# Patient Record
Sex: MALE | Race: WHITE | NOT HISPANIC OR LATINO | Employment: OTHER | URBAN - METROPOLITAN AREA
[De-identification: names, ages, dates, MRNs, and addresses within clinical notes are randomized per-mention and may not be internally consistent; named-entity substitution may affect disease eponyms.]

---

## 2017-01-06 PROBLEM — J01.00 ACUTE NON-RECURRENT MAXILLARY SINUSITIS: Status: ACTIVE | Noted: 2017-01-06

## 2017-04-10 PROBLEM — J01.00 ACUTE NON-RECURRENT MAXILLARY SINUSITIS: Status: RESOLVED | Noted: 2017-01-06 | Resolved: 2017-04-10

## 2017-04-18 PROBLEM — G62.9 PERIPHERAL POLYNEUROPATHY: Status: ACTIVE | Noted: 2017-04-18

## 2017-04-18 PROBLEM — I73.9 CLAUDICATION: Status: ACTIVE | Noted: 2017-04-18

## 2018-08-18 LAB
AMYLASE SERPL-CCNC: 1049 U/L (ref 28–100)
LIPASE SERPL-CCNC: 457 U/L (ref 0–38)

## 2018-08-30 LAB — MAGNESIUM SERPL-MCNC: 1.7 MG/DL (ref 1.5–2.6)

## 2018-09-01 ENCOUNTER — OUTSIDE PLACE OF SERVICE (OUTPATIENT)
Dept: PULMONOLOGY | Facility: CLINIC | Age: 73
End: 2018-09-01
Payer: MEDICARE

## 2018-09-01 PROCEDURE — 99232 SBSQ HOSP IP/OBS MODERATE 35: CPT | Mod: ,,, | Performed by: INTERNAL MEDICINE

## 2018-09-27 RX ORDER — AMLODIPINE BESYLATE 5 MG/1
5 TABLET ORAL DAILY
Refills: 3 | COMMUNITY
Start: 2018-06-25 | End: 2022-11-23 | Stop reason: SDUPTHER

## 2018-09-27 RX ORDER — EZETIMIBE AND SIMVASTATIN 10; 40 MG/1; MG/1
TABLET ORAL
COMMUNITY
End: 2019-12-05

## 2018-09-27 RX ORDER — CEFUROXIME AXETIL 500 MG/1
TABLET ORAL
COMMUNITY
End: 2018-11-12

## 2018-09-27 RX ORDER — LISINOPRIL 20 MG/1
TABLET ORAL
COMMUNITY
End: 2018-09-28

## 2018-09-27 RX ORDER — CEPHALEXIN 500 MG/1
CAPSULE ORAL
COMMUNITY
End: 2018-11-12

## 2018-09-27 RX ORDER — METFORMIN HYDROCHLORIDE 1000 MG/1
TABLET ORAL
COMMUNITY
End: 2018-11-14

## 2018-09-27 RX ORDER — METOPROLOL TARTRATE 25 MG/1
TABLET, FILM COATED ORAL
COMMUNITY
End: 2018-09-27 | Stop reason: SDUPTHER

## 2018-09-27 RX ORDER — OMEGA-3-ACID ETHYL ESTERS 1 G/1
1 CAPSULE, LIQUID FILLED ORAL
COMMUNITY
End: 2018-11-29 | Stop reason: ALTCHOICE

## 2018-09-27 RX ORDER — BETAMETHASONE SODIUM PHOSPHATE AND BETAMETHASONE ACETATE 3; 3 MG/ML; MG/ML
6 INJECTION, SUSPENSION INTRA-ARTICULAR; INTRALESIONAL; INTRAMUSCULAR; SOFT TISSUE
COMMUNITY
Start: 2014-03-05 | End: 2019-04-04

## 2018-09-27 RX ORDER — FAMOTIDINE 20 MG/1
TABLET, FILM COATED ORAL
COMMUNITY
End: 2018-11-29 | Stop reason: ALTCHOICE

## 2018-09-28 ENCOUNTER — OFFICE VISIT (OUTPATIENT)
Dept: SURGERY | Facility: CLINIC | Age: 73
End: 2018-09-28
Payer: MEDICARE

## 2018-09-28 DIAGNOSIS — K85.91 ACUTE PANCREATITIS WITH UNINFECTED NECROSIS, UNSPECIFIED PANCREATITIS TYPE: Primary | ICD-10-CM

## 2018-09-28 PROCEDURE — 99214 OFFICE O/P EST MOD 30 MIN: CPT | Mod: ,,, | Performed by: SURGERY

## 2018-09-28 RX ORDER — PANTOPRAZOLE SODIUM 40 MG/1
40 TABLET, DELAYED RELEASE ORAL DAILY
COMMUNITY
End: 2018-11-29 | Stop reason: ALTCHOICE

## 2018-10-01 NOTE — PROGRESS NOTES
Patient presents for hospital follow-up. Patient had severe necrotizing pancreatitis. Fortunately he recovered. He is currently residing in a rehabilitation facility. Getting rid go home very soon. He is not symptomatic.    Physical examination shows a nontender nondistended abdomen. Patient is a bit weak looking. A bit pale. No respiratory difficulty. Heart is regular.    Last CT scan of the abdomen was performed on 09/04/2018. It showed some peripancreatic fluid. And improving appearance of the pancreas. Ex    Impression/plan: Resolving severe pancreatitis. Peripancreatic fluid collections. At this time I would just follow. Will repeat a CT scan in one month. We'll see patient back here in 6 weeks. We'll base further recommendations on findings of the CT scan.    Case discussed at length with patient's family was present. All medical records were reviewed at length.

## 2018-10-17 ENCOUNTER — TELEPHONE (OUTPATIENT)
Dept: SURGERY | Facility: CLINIC | Age: 73
End: 2018-10-17

## 2018-10-17 DIAGNOSIS — K85.91 ACUTE PANCREATITIS WITH UNINFECTED NECROSIS, UNSPECIFIED PANCREATITIS TYPE: Primary | ICD-10-CM

## 2018-10-17 DIAGNOSIS — R10.84 GENERALIZED ABDOMINAL PAIN: ICD-10-CM

## 2018-10-17 NOTE — TELEPHONE ENCOUNTER
Pt called for CT order.. Per last office visit 9/28/18 pt instructed to RTO in 6 weeks, complete CT 2 weeks prior.    Please Sign order

## 2018-10-26 LAB — ISTAT CREATININE: 0.9 MG/DL (ref 0.6–1.4)

## 2018-10-29 ENCOUNTER — TELEPHONE (OUTPATIENT)
Dept: SURGERY | Facility: CLINIC | Age: 73
End: 2018-10-29

## 2018-10-29 NOTE — TELEPHONE ENCOUNTER
----- Message from Hector ZAVALA MD sent at 10/29/2018 11:20 AM CDT -----  Call patient. CT scan showed some improvement.

## 2018-11-12 ENCOUNTER — OFFICE VISIT (OUTPATIENT)
Dept: SURGERY | Facility: CLINIC | Age: 73
End: 2018-11-12
Payer: MEDICARE

## 2018-11-12 VITALS
SYSTOLIC BLOOD PRESSURE: 122 MMHG | BODY MASS INDEX: 30.93 KG/M2 | WEIGHT: 241 LBS | DIASTOLIC BLOOD PRESSURE: 76 MMHG | HEIGHT: 74 IN

## 2018-11-12 DIAGNOSIS — K85.91 ACUTE PANCREATITIS WITH UNINFECTED NECROSIS, UNSPECIFIED PANCREATITIS TYPE: Primary | ICD-10-CM

## 2018-11-12 PROCEDURE — 99213 OFFICE O/P EST LOW 20 MIN: CPT | Mod: ,,, | Performed by: SURGERY

## 2018-11-12 NOTE — PROGRESS NOTES
"Subjective:       Patient ID: Michael Maldonado is a 73 y.o. male.    Chief Complaint: Consult (Eval possible lap kaylah )      HPI:  Patient is completely recovered from his severe bout of pancreatitis. He is at home and asymptomatic. Both he and his family feel that he is under back to normal. Patient's pseudocyst is shrinking and is asymptomatic. It does not fulfill criteria for surgical intervention. Patient is ready to undergo cholecystectomy to prevent future occurrences of pancreatitis.    Past Medical History:   Diagnosis Date    Coronary atherosclerosis of unspecified type of vessel, native or graft     Diabetes mellitus, type 2     Gout, unspecified     Other and unspecified hyperlipidemia     Unspecified essential hypertension      Past Surgical History:   Procedure Laterality Date    COLONOSCOPY  2010    CORONARY ANGIOPLASTY WITH STENT PLACEMENT  2016    CORONARY ARTERY BYPASS GRAFT  2000        TONSILLECTOMY, ADENOIDECTOMY       Review of patient's allergies indicates:   Allergen Reactions    Cefuroxime axetil Nausea And Vomiting     vomiting        Medication List           Accurate as of 11/12/18 10:09 AM. If you have any questions, ask your nurse or doctor.               CONTINUE taking these medications    amLODIPine 5 MG tablet  Commonly known as:  NORVASC     aspirin 81 MG Chew     aspirin-calcium carbonate 81 mg-300 mg calcium(777 mg) Tab     atorvastatin 40 MG tablet  Commonly known as:  LIPITOR  TAKE 1 TABLET BY MOUTH EACH DAY "THANK YOU"     betamethasone acetate-betamethasone sodium phosphate 6 mg/mL injection  Commonly known as:  CELESTONE     blood sugar diagnostic Strp  Commonly known as:  CONTOUR TEST STRIPS  1 strip by Misc.(Non-Drug; Combo Route) route 2 (two) times daily.     clopidogrel 75 mg tablet  Commonly known as:  PLAVIX  TAKE 1 TABLET BY MOUTH DAILY "THANK YOU"     famotidine 20 MG tablet  Commonly known as:  PEPCID     JANUMET 50-1,000 mg per tablet  Generic " "drug:  SITagliptan-metformin  TAKE 1 TABLET BY MOUTH TWICE DAILY WITH MEALS "THANK YOU"     lisinopril-hydrochlorothiazide 20-12.5 mg per tablet  Commonly known as:  PRINZIDE,ZESTORETIC  Take 1 tablet by mouth 2 (two) times daily.     metFORMIN 1000 MG tablet  Commonly known as:  GLUCOPHAGE     metoprolol succinate 25 MG 24 hr tablet  Commonly known as:  TOPROL-XL  Take 1 tablet (25 mg total) by mouth 2 (two) times daily. Pt takes 1 tablet in am and 1/2 tablet in evening.  (total of 1 1/2 tablets a day)     omega-3 acid ethyl esters 1 gram capsule  Commonly known as:  LOVAZA     pantoprazole 40 MG tablet  Commonly known as:  PROTONIX     promethazine 25 MG tablet  Commonly known as:  PHENERGAN  Take 1 tablet (25 mg total) by mouth every 6 (six) hours as needed for Nausea.     VYTORIN 10-40 10-40 mg per tablet  Generic drug:  ezetimibe-simvastatin 10-40 mg        STOP taking these medications    cefUROXime 500 MG tablet  Commonly known as:  CEFTIN  Stopped by:  Hector Smallwood MD     cephALEXin 500 MG capsule  Commonly known as:  KEFLEX  Stopped by:  Hector Smallwood MD          History reviewed. No pertinent family history.  Social History     Socioeconomic History    Marital status:      Spouse name: None    Number of children: None    Years of education: None    Highest education level: None   Social Needs    Financial resource strain: None    Food insecurity - worry: None    Food insecurity - inability: None    Transportation needs - medical: None    Transportation needs - non-medical: None   Occupational History    None   Tobacco Use    Smoking status: Former Smoker    Smokeless tobacco: Former User     Types: Chew   Substance and Sexual Activity    Alcohol use: Yes     Alcohol/week: 1.0 oz     Types: 2 Standard drinks or equivalent per week    Drug use: No    Sexual activity: None   Other Topics Concern    None   Social History Narrative    None         Review of Systems    Objective: "      Physical Exam   Constitutional: He appears well-developed and well-nourished.  Non-toxic appearance. No distress.   HENT:   Head: Normocephalic and atraumatic. Head is without abrasion and without laceration.   Right Ear: External ear normal.   Left Ear: External ear normal.   Nose: Nose normal.   Mouth/Throat: Oropharynx is clear and moist.   Eyes: EOM are normal. Pupils are equal, round, and reactive to light.   Neck: Trachea normal. No tracheal deviation and normal range of motion present. No thyroid mass and no thyromegaly present.   Cardiovascular: Normal rate and regular rhythm.   Pulmonary/Chest: Effort normal. No accessory muscle usage. No tachypnea. No respiratory distress.   Abdominal: Soft. Normal appearance and bowel sounds are normal. He exhibits no distension and no mass. There is no hepatosplenomegaly. There is no tenderness. There is no tenderness at McBurney's point and negative Henry's sign. No hernia.   Lymphadenopathy:     He has no cervical adenopathy.     He has no axillary adenopathy.        Right: No inguinal adenopathy present.        Left: No inguinal adenopathy present.   Neurological: He is alert. Coordination and gait normal.   Skin: Skin is warm and intact.   Psychiatric: He has a normal mood and affect. His speech is normal and behavior is normal.       Assessment/Plan:   Acute pancreatitis with uninfected necrosis, unspecified pancreatitis type  -     CBC auto differential; Future; Expected date: 11/12/2018  -     Comprehensive metabolic panel; Future; Expected date: 11/12/2018  -     Ambulatory Referral to External Surgery        Planned procedure: Laparoscopic cholecystectomy    Mefoxin 2 gm IV on call to OR unless allergic, if allergic use Levaquin 500 mg IV AND Flagyl 500 mg IV on call to OR    NPO past midnight    Kingsley cloth scrub per protocol    SCDs Bilateral Lower Extremities    I discussed the proposed procedures the the patient including risks, benefits, indications,  alternatives and special concerns.  The patient appears to understand and agrees to go ahead with surgery.  I have made no promises, warranties or verbal agreements beyond what was discussed above.    No Follow-up on file.

## 2018-11-15 LAB
ALBUMIN SERPL-MCNC: 3.5 G/DL (ref 3.1–4.7)
ALP SERPL-CCNC: 123 IU/L (ref 40–104)
ALT (SGPT): 31 IU/L (ref 3–33)
AST SERPL-CCNC: 34 IU/L (ref 10–40)
BASOPHILS NFR BLD: 0.1 K/UL (ref 0–0.2)
BASOPHILS NFR BLD: 0.5 %
BILIRUB SERPL-MCNC: 0.7 MG/DL (ref 0.3–1)
BUN SERPL-MCNC: 10 MG/DL (ref 8–20)
CALCIUM SERPL-MCNC: 9.3 MG/DL (ref 7.7–10.4)
CHLORIDE: 98 MMOL/L (ref 98–110)
CO2 SERPL-SCNC: 30 MMOL/L (ref 22.8–31.6)
CREATININE: 0.78 MG/DL (ref 0.6–1.4)
EOSINOPHIL NFR BLD: 0.2 K/UL (ref 0–0.7)
EOSINOPHIL NFR BLD: 2 %
ERYTHROCYTE [DISTWIDTH] IN BLOOD BY AUTOMATED COUNT: 14.1 % (ref 11.7–14.9)
GLUCOSE: 174 MG/DL (ref 70–99)
GRAN #: 4.3 K/UL (ref 1.4–6.5)
GRAN%: 39.9 %
HCT VFR BLD AUTO: 37.8 % (ref 39–55)
HGB BLD-MCNC: 12.4 G/DL (ref 14–16)
IMMATURE GRANS (ABS): 0 K/UL (ref 0–1)
IMMATURE GRANULOCYTES: 0.3 %
LYMPH #: 5.5 K/UL (ref 1.2–3.4)
LYMPH%: 51.3 %
MCH RBC QN AUTO: 27.7 PG (ref 25–35)
MCHC RBC AUTO-ENTMCNC: 32.8 G/DL (ref 31–36)
MCV RBC AUTO: 84.4 FL (ref 80–100)
MONO #: 0.7 K/UL (ref 0.1–0.6)
MONO%: 6 %
NUCLEATED RBCS: 0 %
PLATELET # BLD AUTO: 236 K/UL (ref 140–440)
PMV BLD AUTO: 10.4 FL (ref 8.8–12.7)
POTASSIUM SERPL-SCNC: 3.8 MMOL/L (ref 3.5–5)
PROT SERPL-MCNC: 6.9 G/DL (ref 6–8.2)
RBC # BLD AUTO: 4.48 M/UL (ref 4.3–5.9)
SODIUM: 135 MMOL/L (ref 134–144)
WBC # BLD AUTO: 10.8 K/UL (ref 5–10)

## 2018-12-03 ENCOUNTER — OFFICE VISIT (OUTPATIENT)
Dept: SURGERY | Facility: CLINIC | Age: 73
End: 2018-12-03
Payer: MEDICARE

## 2018-12-03 VITALS
HEIGHT: 74 IN | BODY MASS INDEX: 26.69 KG/M2 | WEIGHT: 208 LBS | DIASTOLIC BLOOD PRESSURE: 76 MMHG | SYSTOLIC BLOOD PRESSURE: 124 MMHG

## 2018-12-03 DIAGNOSIS — K85.91 ACUTE PANCREATITIS WITH UNINFECTED NECROSIS, UNSPECIFIED PANCREATITIS TYPE: Primary | ICD-10-CM

## 2018-12-03 PROCEDURE — 99024 POSTOP FOLLOW-UP VISIT: CPT | Mod: POP,,, | Performed by: SURGERY

## 2018-12-03 NOTE — PROGRESS NOTES
Subjective:       Patient ID: Michael Maldonado is a 73 y.o. male.    Chief Complaint: Post-op Evaluation ([facility] DOS 11/20/18 L/S Allyson)      HPI:  Michael Maldondao is here for post-op. Patient has no systemic complaints. Post operative   pain is under control.  Tolerating diet, no nausea/vomiting.  Having normal bowel movements.        Objective:      Physical Exam   Constitutional: He is oriented to person, place, and time. He is cooperative. No distress.   Neurological: He is alert and oriented to person, place, and time.   Skin:   Incisions are clean, dry and intact   There is no evidence of infection, hematoma or seroma.        Assessment/Plan:   Acute pancreatitis with uninfected necrosis, unspecified pancreatitis type      Doing well status post labral scopic cholecystectomy    Follow-up if symptoms worsen or fail to improve.

## 2021-07-29 ENCOUNTER — OFFICE VISIT (OUTPATIENT)
Dept: URGENT CARE | Facility: CLINIC | Age: 76
End: 2021-07-29
Payer: MEDICARE

## 2021-07-29 VITALS
HEIGHT: 74 IN | TEMPERATURE: 98 F | DIASTOLIC BLOOD PRESSURE: 54 MMHG | WEIGHT: 228 LBS | RESPIRATION RATE: 18 BRPM | HEART RATE: 71 BPM | OXYGEN SATURATION: 95 % | BODY MASS INDEX: 29.26 KG/M2 | SYSTOLIC BLOOD PRESSURE: 105 MMHG

## 2021-07-29 DIAGNOSIS — L03.116 CELLULITIS AND ABSCESS OF LEFT LEG: ICD-10-CM

## 2021-07-29 DIAGNOSIS — J18.9 PNEUMONIA OF LOWER LOBE DUE TO INFECTIOUS ORGANISM, UNSPECIFIED LATERALITY: Primary | ICD-10-CM

## 2021-07-29 DIAGNOSIS — L02.416 CELLULITIS AND ABSCESS OF LEFT LEG: ICD-10-CM

## 2021-07-29 PROCEDURE — 99204 OFFICE O/P NEW MOD 45 MIN: CPT | Mod: S$GLB,,, | Performed by: NURSE PRACTITIONER

## 2021-07-29 PROCEDURE — 99204 PR OFFICE/OUTPT VISIT, NEW, LEVL IV, 45-59 MIN: ICD-10-PCS | Mod: S$GLB,,, | Performed by: NURSE PRACTITIONER

## 2021-07-29 RX ORDER — MUPIROCIN 20 MG/G
OINTMENT TOPICAL 2 TIMES DAILY
Qty: 30 G | Refills: 0 | Status: SHIPPED | OUTPATIENT
Start: 2021-07-29 | End: 2023-03-19

## 2021-07-29 RX ORDER — LEVOFLOXACIN 500 MG/1
500 TABLET, FILM COATED ORAL DAILY
Qty: 10 TABLET | Refills: 0 | Status: SHIPPED | OUTPATIENT
Start: 2021-07-29 | End: 2021-08-08

## 2022-06-17 ENCOUNTER — OCCUPATIONAL HEALTH (OUTPATIENT)
Dept: URGENT CARE | Facility: CLINIC | Age: 77
End: 2022-06-17

## 2022-06-17 DIAGNOSIS — Z13.9 ENCOUNTER FOR SCREENING: Primary | ICD-10-CM

## 2022-06-17 LAB — COLLECTION ONLY: NORMAL

## 2022-06-17 PROCEDURE — 80306 OOH DOT DRUG SCREEN: ICD-10-PCS | Mod: S$GLB,,, | Performed by: NURSE PRACTITIONER

## 2022-06-17 PROCEDURE — 99499 PHYSICAL - BASIC COMPLEXITY: ICD-10-PCS | Mod: S$GLB,,, | Performed by: NURSE PRACTITIONER

## 2022-06-17 PROCEDURE — 99499 UNLISTED E&M SERVICE: CPT | Mod: S$GLB,,, | Performed by: NURSE PRACTITIONER

## 2022-06-17 PROCEDURE — 80306 DRUG TEST PRSMV INSTRMNT: CPT | Mod: S$GLB,,, | Performed by: NURSE PRACTITIONER

## 2023-01-10 ENCOUNTER — OFFICE VISIT (OUTPATIENT)
Dept: URGENT CARE | Facility: CLINIC | Age: 78
End: 2023-01-10
Payer: MEDICARE

## 2023-01-10 VITALS
WEIGHT: 220 LBS | OXYGEN SATURATION: 97 % | DIASTOLIC BLOOD PRESSURE: 60 MMHG | BODY MASS INDEX: 28.23 KG/M2 | HEART RATE: 63 BPM | TEMPERATURE: 97 F | SYSTOLIC BLOOD PRESSURE: 122 MMHG | HEIGHT: 74 IN

## 2023-01-10 DIAGNOSIS — J06.9 UPPER RESPIRATORY TRACT INFECTION, UNSPECIFIED TYPE: Primary | ICD-10-CM

## 2023-01-10 PROCEDURE — 99203 OFFICE O/P NEW LOW 30 MIN: CPT | Mod: S$GLB,,, | Performed by: NURSE PRACTITIONER

## 2023-01-10 PROCEDURE — 99203 PR OFFICE/OUTPT VISIT, NEW, LEVL III, 30-44 MIN: ICD-10-PCS | Mod: S$GLB,,, | Performed by: NURSE PRACTITIONER

## 2023-01-10 RX ORDER — PREDNISONE 10 MG/1
TABLET ORAL
Qty: 8 TABLET | Refills: 0 | Status: SHIPPED | OUTPATIENT
Start: 2023-01-10 | End: 2023-02-28

## 2023-01-10 RX ORDER — PROMETHAZINE HYDROCHLORIDE AND DEXTROMETHORPHAN HYDROBROMIDE 6.25; 15 MG/5ML; MG/5ML
5 SYRUP ORAL EVERY 4 HOURS PRN
Qty: 120 ML | Refills: 0 | Status: SHIPPED | OUTPATIENT
Start: 2023-01-10 | End: 2023-01-20

## 2023-01-10 RX ORDER — AZITHROMYCIN 250 MG/1
TABLET, FILM COATED ORAL
Qty: 6 TABLET | Refills: 0 | Status: SHIPPED | OUTPATIENT
Start: 2023-01-10 | End: 2023-02-28

## 2023-01-10 NOTE — PROGRESS NOTES
"Subjective:       Patient ID: Michael Maldonado is a 77 y.o. male.    Vitals:  height is 6' 2" (1.88 m) and weight is 99.8 kg (220 lb). His oral temperature is 97.4 °F (36.3 °C). His blood pressure is 122/60 and his pulse is 63. His oxygen saturation is 97%.     Chief Complaint: Sinus Problem    This is a 77 y.o. male who presents today with a chief complaint of  Patient presents with:  Sinus Problem x 10 days. Patient stated that it started on 1/1, same symptoms.         Sinus Problem  This is a new problem. The current episode started 1 to 4 weeks ago. The problem is unchanged. There has been no fever. Associated symptoms include congestion, coughing, sinus pressure and a sore throat.     HENT:  Positive for congestion, sinus pressure and sore throat.    Respiratory:  Positive for cough.          Objective:      Physical Exam   Constitutional: He is oriented to person, place, and time. He appears well-developed. He is cooperative.  Non-toxic appearance. He does not appear ill. No distress.   HENT:   Head: Normocephalic and atraumatic.   Ears:   Right Ear: Hearing, external ear and ear canal normal. Tympanic membrane is injected, erythematous and bulging.   Left Ear: Hearing, external ear and ear canal normal. Tympanic membrane is injected, erythematous and bulging.   Nose: Nose normal. Purulent discharge present. No mucosal edema, rhinorrhea or nasal deformity. No epistaxis. Right sinus exhibits no maxillary sinus tenderness and no frontal sinus tenderness. Left sinus exhibits no maxillary sinus tenderness and no frontal sinus tenderness.   Mouth/Throat: Uvula is midline and mucous membranes are normal. No trismus in the jaw. Normal dentition. No uvula swelling. Posterior oropharyngeal erythema present. No oropharyngeal exudate or posterior oropharyngeal edema.   Eyes: Conjunctivae and lids are normal. No scleral icterus.   Neck: Trachea normal and phonation normal. Neck supple. No edema present. No erythema present. " No neck rigidity present.   Cardiovascular: Normal rate, regular rhythm, normal heart sounds and normal pulses.   Pulmonary/Chest: Effort normal and breath sounds normal. No respiratory distress. He has no decreased breath sounds. He has no rhonchi.   Abdominal: Normal appearance.   Musculoskeletal: Normal range of motion.         General: No deformity. Normal range of motion.   Neurological: He is alert and oriented to person, place, and time. He exhibits normal muscle tone. Coordination normal.   Skin: Skin is warm, dry, intact, not diaphoretic and not pale.   Psychiatric: His speech is normal and behavior is normal. Judgment and thought content normal.   Nursing note and vitals reviewed.      Past medical history and current medications reviewed.       Assessment:           1. Upper respiratory tract infection, unspecified type              Plan:         Upper respiratory tract infection, unspecified type             Patient Instructions     You must understand that you've received an Urgent Care treatment only and that you may be released before all your medical problems are known or treated. You, the patient, will arrange for follow up care as instructed.  Follow up with your PCP or specialty clinic as directed in the next 1-2 weeks if not improved or as needed.  You can call [phone] to schedule an appointment with the appropriate provider.  If your condition worsens we recommend that you receive another evaluation at the emergency room immediately or contact your primary medical clinics after hours call service to discuss your concerns.  Please return here or go to the Emergency Department for any concerns or worsening of condition.    If you were prescribed a narcotic or controlled medication, do not drive or operate heavy equipment or machinery while taking these medications.

## 2023-01-10 NOTE — PATIENT INSTRUCTIONS
You must understand that you've received an Urgent Care treatment only and that you may be released before all your medical problems are known or treated. You, the patient, will arrange for follow up care as instructed.  Follow up with your PCP or specialty clinic as directed in the next 1-2 weeks if not improved or as needed.  You can call [phone] to schedule an appointment with the appropriate provider.  If your condition worsens we recommend that you receive another evaluation at the emergency room immediately or contact your primary medical clinics after hours call service to discuss your concerns.  Please return here or go to the Emergency Department for any concerns or worsening of condition.    If you were prescribed a narcotic or controlled medication, do not drive or operate heavy equipment or machinery while taking these medications.

## 2023-03-17 ENCOUNTER — OFFICE VISIT (OUTPATIENT)
Dept: PODIATRY | Facility: CLINIC | Age: 78
End: 2023-03-17
Payer: MEDICARE

## 2023-03-17 VITALS
DIASTOLIC BLOOD PRESSURE: 74 MMHG | WEIGHT: 217 LBS | RESPIRATION RATE: 16 BRPM | BODY MASS INDEX: 27.85 KG/M2 | SYSTOLIC BLOOD PRESSURE: 144 MMHG | HEIGHT: 74 IN | HEART RATE: 62 BPM

## 2023-03-17 DIAGNOSIS — L85.1 ACQUIRED KERATOSIS OF PLANTAR ASPECT OF FOOT: Primary | ICD-10-CM

## 2023-03-17 DIAGNOSIS — E11.65 TYPE 2 DIABETES MELLITUS WITH HYPERGLYCEMIA, WITHOUT LONG-TERM CURRENT USE OF INSULIN: ICD-10-CM

## 2023-03-17 DIAGNOSIS — B35.1 ONYCHOMYCOSIS OF TOENAIL: ICD-10-CM

## 2023-03-17 DIAGNOSIS — L84 FOOT CALLUS: ICD-10-CM

## 2023-03-17 DIAGNOSIS — M79.671 RIGHT FOOT PAIN: ICD-10-CM

## 2023-03-17 PROCEDURE — 99999 PR PBB SHADOW E&M-EST. PATIENT-LVL V: CPT | Mod: PBBFAC,,, | Performed by: PODIATRIST

## 2023-03-17 PROCEDURE — 99202 OFFICE O/P NEW SF 15 MIN: CPT | Mod: S$PBB,,, | Performed by: PODIATRIST

## 2023-03-17 PROCEDURE — 99202 PR OFFICE/OUTPT VISIT, NEW, LEVL II, 15-29 MIN: ICD-10-PCS | Mod: S$PBB,,, | Performed by: PODIATRIST

## 2023-03-17 PROCEDURE — 99215 OFFICE O/P EST HI 40 MIN: CPT | Mod: PBBFAC | Performed by: PODIATRIST

## 2023-03-17 PROCEDURE — 99999 PR PBB SHADOW E&M-EST. PATIENT-LVL V: ICD-10-PCS | Mod: PBBFAC,,, | Performed by: PODIATRIST

## 2023-03-20 NOTE — PROGRESS NOTES
"Subjective:       Patient ID: Michael Maldonado is a 77 y.o. male.    Chief Complaint: Callouses, Foot Pain, Foreign Body, and Diabetes Mellitus  Patient presents referral Lily Ortiz NP with complaint pain/foreign body area under 5th digit right foot.  Relates this is new, does not recall stepping on anything but feels like there is something sharp in this location.  Denies change in shoes or activity.  History type 2 diabetes which patient feels is fairly well controlled.  No history of foot sores or infections.  Pain level 8/10    Past Medical History:   Diagnosis Date    Coronary atherosclerosis of unspecified type of vessel, native or graft     Diabetes mellitus, type 2     Gout, unspecified     Other and unspecified hyperlipidemia     Unspecified essential hypertension      Past Surgical History:   Procedure Laterality Date    CHOLECYSTECTOMY  11/20/2018        COLONOSCOPY  2010    CORONARY ANGIOPLASTY WITH STENT PLACEMENT  2016    CORONARY ARTERY BYPASS GRAFT  2000    Dr.Deece brand kaylah  10/2018    TONSILLECTOMY, ADENOIDECTOMY       History reviewed. No pertinent family history.  Social History     Socioeconomic History    Marital status:    Tobacco Use    Smoking status: Former    Smokeless tobacco: Former     Types: Chew   Substance and Sexual Activity    Alcohol use: Yes     Alcohol/week: 1.7 standard drinks     Types: 2 Standard drinks or equivalent per week    Drug use: No       Current Outpatient Medications   Medication Sig Dispense Refill    amLODIPine (NORVASC) 5 MG tablet Take 1 tablet (5 mg total) by mouth once daily. 90 tablet 3    aspirin 81 MG Chew Take 81 mg by mouth once daily.      atorvastatin (LIPITOR) 40 MG tablet TAKE 1 TABLET BY MOUTH EACH DAY "THANK YOU" 90 tablet 3    azelastine (ASTELIN) 137 mcg (0.1 %) nasal spray 1 spray (137 mcg total) by Nasal route 2 (two) times daily. 30 mL 0    blood sugar diagnostic (CONTOUR TEST STRIPS) Strp 1 strip by Misc.(Non-Drug; Combo " "Route) route 2 (two) times daily. 100 each 11    clopidogreL (PLAVIX) 75 mg tablet TAKE 1 TABLET BY MOUTH DAILY "THANK YOU" 90 tablet 3    dapagliflozin (FARXIGA) 10 mg tablet Take 1 tablet (10 mg total) by mouth once daily. 90 tablet 3    fluticasone propionate (FLONASE) 50 mcg/actuation nasal spray 2 sprays (100 mcg total) by Each Nostril route once daily. 15.8 mL 0    glimepiride (AMARYL) 4 MG tablet Take 2 tablets (8 mg total) by mouth daily with breakfast. 180 tablet 1    lisinopriL-hydrochlorothiazide (PRINZIDE,ZESTORETIC) 20-12.5 mg per tablet Take 1 tablet by mouth 2 (two) times daily. 180 tablet 3    metoprolol succinate (TOPROL-XL) 25 MG 24 hr tablet TAKE 1 TABLET BY MOUTH EACH MORNING AND TAKE 1/2 TABLET BY MOUTH EACH EVENING "THANK YOU" 135 tablet 3    SITagliptan-metformin (JANUMET) 50-1,000 mg per tablet Take 1 tablet by mouth 2 (two) times daily with meals. 180 tablet 3    UNIFINE PENTIPS PLUS 32 gauge x 5/32" Ndle USE 3 TIMES A DAY WITH MEALS "THANK YOU" 200 each 11    mupirocin (BACTROBAN) 2 % ointment Apply topically 2 (two) times daily. Left leg (Patient not taking: Reported on 3/14/2023) 30 g 0     No current facility-administered medications for this visit.     Review of patient's allergies indicates:   Allergen Reactions    Cefuroxime axetil Nausea And Vomiting     vomiting       Review of Systems   HENT:  Negative for congestion.    Respiratory:  Negative for cough.    Cardiovascular:  Negative for leg swelling.   All other systems reviewed and are negative.    Objective:      Vitals:    03/17/23 1421   BP: (!) 144/74   Pulse: 62   Resp: 16   Weight: 98.4 kg (217 lb)   Height: 6' 2" (1.88 m)     Physical Exam  Vitals and nursing note reviewed. Exam conducted with a chaperone present.   Constitutional:       General: He is not in acute distress.     Appearance: Normal appearance.   Cardiovascular:      Pulses:           Dorsalis pedis pulses are 2+ on the right side and 2+ on the left side.     "    Posterior tibial pulses are 1+ on the right side and 1+ on the left side.   Pulmonary:      Effort: Pulmonary effort is normal.   Musculoskeletal:      Right foot: Prominent metatarsal heads present.      Left foot: Prominent metatarsal heads present.   Feet:      Right foot:      Skin integrity: Callus (Painful callus with IPK K sub 5th met head right foot, no evidence of foreign body) present.      Toenail Condition: Fungal disease present.     Left foot:      Skin integrity: No callus.      Toenail Condition: Fungal disease present.  Skin:     Capillary Refill: Capillary refill takes 2 to 3 seconds.   Neurological:      General: No focal deficit present.      Mental Status: He is alert.   Psychiatric:         Mood and Affect: Mood normal.         Behavior: Behavior normal.         Thought Content: Thought content normal.         Judgment: Judgment normal.                   Assessment:       1. Acquired keratosis of plantar aspect of foot    2. Type 2 diabetes mellitus with hyperglycemia, without long-term current use of insulin    3. Foot callus    4. Right foot pain    5. Onychomycosis of toenail          Plan:         Reassured patient small gavi of discoloration was removed through debridement of callus and unrelated to deeper area of callus causing pain.  We discussed seed corn, deep callus which typically feels like your walking on a rock.  Explained under the 5th met head/5th digit is the most common area to develop this and is due to pressure.  We discussed foot type and structure, fairly high arch, prominent ball of the foot and the need for appropriate walking shoe with thick sole for shock absorption to help cushion this area additionally we discussed arch support.  Recommended full length arch support or diabetic insole, remove existing insoles from tennis shoes to help offload pressure from this area  We discussed appropriate shoes indoors to help prevent recurrence as these calluses/seed corns  often recur  We discussed topical treatment, lotion moisturizer Vicks vapor rub, tea tree, any product to soften the area and try to avoid recurrence should be applied each night before bed  Callus was debrided and IPK removed with no complications  Reviewed diabetic education, nails with fungal involvement are well maintained, discussed regular foot checks, contact the office with any area of concern which has not improved in 3-4 days.  Patient was in understanding and agreement with treatment plan.  I counseled the patient on their conditions, implications and medical management.  Instructed patient/family to contact the office with any changes, questions, concerns, worsening of symptoms.   Total face to face time 20 minutes, exam, assessment, treatment, discussion, additional time for review of chart prior to and following appointment and visit documentation, consultation and coordination of care.   Follow up as needed    This note was created using M*Modal voice recognition software that occasionally misinterpreted phrases or words.

## 2023-05-23 ENCOUNTER — LAB VISIT (OUTPATIENT)
Dept: LAB | Facility: HOSPITAL | Age: 78
End: 2023-05-23
Attending: INTERNAL MEDICINE
Payer: MEDICARE

## 2023-05-23 DIAGNOSIS — Z79.899 ENCOUNTER FOR LONG-TERM CURRENT USE OF MEDICATION: ICD-10-CM

## 2023-05-23 DIAGNOSIS — D64.9 ANEMIA, UNSPECIFIED TYPE: ICD-10-CM

## 2023-05-23 DIAGNOSIS — I25.10 ATHEROSCLEROSIS OF CORONARY ARTERY, UNSPECIFIED VESSEL OR LESION TYPE, UNSPECIFIED WHETHER ANGINA PRESENT, UNSPECIFIED WHETHER NATIVE OR TRANSPLANTED HEART: ICD-10-CM

## 2023-05-23 DIAGNOSIS — G62.9 PERIPHERAL POLYNEUROPATHY: ICD-10-CM

## 2023-05-23 DIAGNOSIS — E11.65 TYPE 2 DIABETES MELLITUS WITH HYPERGLYCEMIA, WITHOUT LONG-TERM CURRENT USE OF INSULIN: ICD-10-CM

## 2023-05-23 DIAGNOSIS — I10 ESSENTIAL HYPERTENSION: ICD-10-CM

## 2023-05-23 DIAGNOSIS — E78.00 HIGH CHOLESTEROL: ICD-10-CM

## 2023-05-23 DIAGNOSIS — M79.671 RIGHT FOOT PAIN: ICD-10-CM

## 2023-05-23 DIAGNOSIS — R73.09 HEMOGLOBIN A1C GREATER THAN 9%, INDICATING POOR DIABETIC CONTROL: ICD-10-CM

## 2023-05-23 DIAGNOSIS — Z12.5 SCREENING PSA (PROSTATE SPECIFIC ANTIGEN): ICD-10-CM

## 2023-05-23 DIAGNOSIS — Z79.899 ENCOUNTER FOR MEDICATION MANAGEMENT: ICD-10-CM

## 2023-05-23 DIAGNOSIS — Z95.5 STENTED CORONARY ARTERY: ICD-10-CM

## 2023-05-23 LAB
ALBUMIN SERPL BCP-MCNC: 3.8 G/DL (ref 3.5–5.2)
ALP SERPL-CCNC: 93 U/L (ref 55–135)
ALT SERPL W/O P-5'-P-CCNC: 14 U/L (ref 10–44)
ANION GAP SERPL CALC-SCNC: 8 MMOL/L (ref 8–16)
AST SERPL-CCNC: 18 U/L (ref 10–40)
BASOPHILS # BLD AUTO: 0.09 K/UL (ref 0–0.2)
BASOPHILS NFR BLD: 0.9 % (ref 0–1.9)
BILIRUB SERPL-MCNC: 0.7 MG/DL (ref 0.1–1)
BNP SERPL-MCNC: 73 PG/ML (ref 0–99)
BUN SERPL-MCNC: 21 MG/DL (ref 8–23)
CALCIUM SERPL-MCNC: 9.8 MG/DL (ref 8.7–10.5)
CHLORIDE SERPL-SCNC: 106 MMOL/L (ref 95–110)
CHOLEST SERPL-MCNC: 96 MG/DL (ref 120–199)
CHOLEST/HDLC SERPL: 3.7 {RATIO} (ref 2–5)
CO2 SERPL-SCNC: 23 MMOL/L (ref 23–29)
COMPLEXED PSA SERPL-MCNC: 0.42 NG/ML (ref 0–4)
CREAT SERPL-MCNC: 1.2 MG/DL (ref 0.5–1.4)
DIFFERENTIAL METHOD: ABNORMAL
EOSINOPHIL # BLD AUTO: 0.9 K/UL (ref 0–0.5)
EOSINOPHIL NFR BLD: 8.7 % (ref 0–8)
ERYTHROCYTE [DISTWIDTH] IN BLOOD BY AUTOMATED COUNT: 14 % (ref 11.5–14.5)
EST. GFR  (NO RACE VARIABLE): >60 ML/MIN/1.73 M^2
ESTIMATED AVG GLUCOSE: 174 MG/DL (ref 68–131)
GLUCOSE SERPL-MCNC: 136 MG/DL (ref 70–110)
HBA1C MFR BLD: 7.7 % (ref 4–5.6)
HCT VFR BLD AUTO: 43.7 % (ref 40–54)
HDLC SERPL-MCNC: 26 MG/DL (ref 40–75)
HDLC SERPL: 27.1 % (ref 20–50)
HGB BLD-MCNC: 14.4 G/DL (ref 14–18)
IMM GRANULOCYTES # BLD AUTO: 0.02 K/UL (ref 0–0.04)
IMM GRANULOCYTES NFR BLD AUTO: 0.2 % (ref 0–0.5)
LDLC SERPL CALC-MCNC: 44.2 MG/DL (ref 63–159)
LYMPHOCYTES # BLD AUTO: 3.9 K/UL (ref 1–4.8)
LYMPHOCYTES NFR BLD: 37.5 % (ref 18–48)
MAGNESIUM SERPL-MCNC: 1.8 MG/DL (ref 1.6–2.6)
MCH RBC QN AUTO: 28.8 PG (ref 27–31)
MCHC RBC AUTO-ENTMCNC: 33 G/DL (ref 32–36)
MCV RBC AUTO: 87 FL (ref 82–98)
MONOCYTES # BLD AUTO: 0.7 K/UL (ref 0.3–1)
MONOCYTES NFR BLD: 6.8 % (ref 4–15)
NEUTROPHILS # BLD AUTO: 4.7 K/UL (ref 1.8–7.7)
NEUTROPHILS NFR BLD: 45.9 % (ref 38–73)
NONHDLC SERPL-MCNC: 70 MG/DL
NRBC BLD-RTO: 0 /100 WBC
PLATELET # BLD AUTO: 200 K/UL (ref 150–450)
PMV BLD AUTO: 9.3 FL (ref 9.2–12.9)
POTASSIUM SERPL-SCNC: 4.3 MMOL/L (ref 3.5–5.1)
PROT SERPL-MCNC: 7.4 G/DL (ref 6–8.4)
RBC # BLD AUTO: 5 M/UL (ref 4.6–6.2)
SODIUM SERPL-SCNC: 137 MMOL/L (ref 136–145)
T3FREE SERPL-MCNC: 2.4 PG/ML (ref 2.3–4.2)
T4 FREE SERPL-MCNC: 0.95 NG/DL (ref 0.71–1.51)
TRIGL SERPL-MCNC: 129 MG/DL (ref 30–150)
TSH SERPL DL<=0.005 MIU/L-ACNC: 1.65 UIU/ML (ref 0.4–4)
WBC # BLD AUTO: 10.3 K/UL (ref 3.9–12.7)

## 2023-05-23 PROCEDURE — 84443 ASSAY THYROID STIM HORMONE: CPT | Performed by: INTERNAL MEDICINE

## 2023-05-23 PROCEDURE — 84439 ASSAY OF FREE THYROXINE: CPT | Performed by: INTERNAL MEDICINE

## 2023-05-23 PROCEDURE — 80061 LIPID PANEL: CPT | Performed by: INTERNAL MEDICINE

## 2023-05-23 PROCEDURE — 83036 HEMOGLOBIN GLYCOSYLATED A1C: CPT | Performed by: INTERNAL MEDICINE

## 2023-05-23 PROCEDURE — 84153 ASSAY OF PSA TOTAL: CPT | Performed by: INTERNAL MEDICINE

## 2023-05-23 PROCEDURE — 85025 COMPLETE CBC W/AUTO DIFF WBC: CPT | Performed by: INTERNAL MEDICINE

## 2023-05-23 PROCEDURE — 36415 COLL VENOUS BLD VENIPUNCTURE: CPT | Performed by: INTERNAL MEDICINE

## 2023-05-23 PROCEDURE — 83880 ASSAY OF NATRIURETIC PEPTIDE: CPT | Performed by: INTERNAL MEDICINE

## 2023-05-23 PROCEDURE — 83735 ASSAY OF MAGNESIUM: CPT | Performed by: INTERNAL MEDICINE

## 2023-05-23 PROCEDURE — 80053 COMPREHEN METABOLIC PANEL: CPT | Performed by: INTERNAL MEDICINE

## 2023-05-23 PROCEDURE — 84481 FREE ASSAY (FT-3): CPT | Performed by: INTERNAL MEDICINE

## 2023-12-28 ENCOUNTER — TELEPHONE (OUTPATIENT)
Dept: PODIATRY | Facility: CLINIC | Age: 78
End: 2023-12-28
Payer: MEDICARE

## 2024-01-12 ENCOUNTER — OFFICE VISIT (OUTPATIENT)
Dept: PODIATRY | Facility: CLINIC | Age: 79
End: 2024-01-12
Payer: MEDICARE

## 2024-01-12 VITALS
SYSTOLIC BLOOD PRESSURE: 115 MMHG | HEART RATE: 70 BPM | BODY MASS INDEX: 25.37 KG/M2 | WEIGHT: 197.69 LBS | HEIGHT: 74 IN | DIASTOLIC BLOOD PRESSURE: 65 MMHG

## 2024-01-12 DIAGNOSIS — M20.42 HAMMER TOES OF BOTH FEET: ICD-10-CM

## 2024-01-12 DIAGNOSIS — M21.621 TAILOR'S BUNION OF RIGHT FOOT: ICD-10-CM

## 2024-01-12 DIAGNOSIS — M79.671 RIGHT FOOT PAIN: ICD-10-CM

## 2024-01-12 DIAGNOSIS — B35.1 ONYCHOMYCOSIS OF TOENAIL: ICD-10-CM

## 2024-01-12 DIAGNOSIS — M20.41 HAMMER TOES OF BOTH FEET: ICD-10-CM

## 2024-01-12 DIAGNOSIS — E11.65 TYPE 2 DIABETES MELLITUS WITH HYPERGLYCEMIA, WITHOUT LONG-TERM CURRENT USE OF INSULIN: ICD-10-CM

## 2024-01-12 DIAGNOSIS — E11.9 COMPREHENSIVE DIABETIC FOOT EXAMINATION, TYPE 2 DM, ENCOUNTER FOR: Primary | ICD-10-CM

## 2024-01-12 DIAGNOSIS — L85.1 ACQUIRED KERATOSIS OF PLANTAR ASPECT OF FOOT: ICD-10-CM

## 2024-01-12 PROCEDURE — 99214 OFFICE O/P EST MOD 30 MIN: CPT | Mod: S$PBB,,, | Performed by: PODIATRIST

## 2024-01-12 PROCEDURE — 99999 PR PBB SHADOW E&M-EST. PATIENT-LVL IV: CPT | Mod: PBBFAC,,, | Performed by: PODIATRIST

## 2024-01-12 PROCEDURE — 99214 OFFICE O/P EST MOD 30 MIN: CPT | Mod: PBBFAC | Performed by: PODIATRIST

## 2024-01-15 NOTE — PROGRESS NOTES
"Subjective:       Patient ID: Michael Maldonado is a 78 y.o. male.    Chief Complaint: Foot Pain, Foot Problem, Nail Problem, and Callouses  Patient for annual diabetic foot exam.  Was referred by his nurse practitioner.  Patient relates he does do a fair amount of walking each day, tries to stay active  Had a heart stent done yesterday by Dr. Mckeon.  Patient states he feels fine.  No complaints today other than a painful callus on the ball of the right foot.  He relates a long history of type 2 diabetes, feels it is well-controlled, believes his last A1c was 7.3.  Does not check glucose daily.  Does relate overall foot pain but denies burning or tingling.  PCP has diagnosis of polyneuropathy        Past Medical History:   Diagnosis Date    Coronary atherosclerosis of unspecified type of vessel, native or graft     Diabetes mellitus, type 2     Gout, unspecified     Other and unspecified hyperlipidemia     Unspecified essential hypertension      Past Surgical History:   Procedure Laterality Date    CHOLECYSTECTOMY  11/20/2018        COLONOSCOPY  2010    CORONARY ANGIOPLASTY WITH STENT PLACEMENT  2016    CORONARY ARTERY BYPASS GRAFT  2000        heart stent N/A 01/11/2024    lap kaylah  10/2018    TONSILLECTOMY, ADENOIDECTOMY       History reviewed. No pertinent family history.  Social History     Socioeconomic History    Marital status:    Tobacco Use    Smoking status: Former    Smokeless tobacco: Former     Types: Chew   Substance and Sexual Activity    Alcohol use: Yes     Alcohol/week: 1.7 standard drinks of alcohol     Types: 2 Standard drinks or equivalent per week    Drug use: No       Current Outpatient Medications   Medication Sig Dispense Refill    amLODIPine (NORVASC) 5 MG tablet Take 1 tablet (5 mg total) by mouth once daily. 90 tablet 3    aspirin 81 MG Chew Take 81 mg by mouth once daily.      atorvastatin (LIPITOR) 40 MG tablet TAKE 1 TABLET BY MOUTH EACH DAY "THANK YOU" 90 " "tablet 3    blood sugar diagnostic (CONTOUR TEST STRIPS) Strp 1 strip by Misc.(Non-Drug; Combo Route) route 2 (two) times daily. 100 each 11    dapagliflozin propanediol (FARXIGA) 10 mg tablet Take 1 tablet (10 mg total) by mouth once daily. 90 tablet 3    glimepiride (AMARYL) 4 MG tablet Take 2 tablets (8 mg total) by mouth daily with breakfast. 180 tablet 1    lisinopriL-hydrochlorothiazide (PRINZIDE,ZESTORETIC) 20-12.5 mg per tablet Take 1 tablet by mouth 2 (two) times daily. 180 tablet 3    metoprolol succinate (TOPROL-XL) 25 MG 24 hr tablet TAKE 1 TABLET BY MOUTH EACH MORNING AND TAKE 1/2 TABLET BY MOUTH EACH EVENING "THANK YOU" 135 tablet 3    SITagliptan-metformin (JANUMET) 50-1,000 mg per tablet Take 1 tablet by mouth 2 (two) times daily with meals. 180 tablet 3    UNIFINE PENTIPS PLUS 32 gauge x 5/32" Ndle USE 3 TIMES A DAY WITH MEALS "THANK YOU" 200 each 11    azelastine (ASTELIN) 137 mcg (0.1 %) nasal spray 1 spray (137 mcg total) by Nasal route 2 (two) times daily. (Patient not taking: Reported on 1/12/2024) 30 mL 0    clopidogreL (PLAVIX) 75 mg tablet TAKE 1 TABLET BY MOUTH DAILY "THANK YOU" 90 tablet 3    fluticasone propionate (FLONASE) 50 mcg/actuation nasal spray 2 sprays (100 mcg total) by Each Nostril route once daily. (Patient not taking: Reported on 1/12/2024) 15.8 mL 0     No current facility-administered medications for this visit.     Review of patient's allergies indicates:   Allergen Reactions    Cefuroxime axetil Nausea And Vomiting     vomiting       Review of Systems   Musculoskeletal:  Positive for gait problem.   All other systems reviewed and are negative.      Objective:      Vitals:    01/12/24 1332   BP: 115/65   Pulse: 70   Weight: 89.7 kg (197 lb 11.2 oz)   Height: 6' 2" (1.88 m)     Physical Exam  Vitals and nursing note reviewed. Exam conducted with a chaperone present.   Constitutional:       General: He is not in acute distress.     Appearance: Normal appearance. "   Cardiovascular:      Pulses:           Dorsalis pedis pulses are 1+ on the right side and 1+ on the left side.        Posterior tibial pulses are 1+ on the right side and 1+ on the left side.   Pulmonary:      Effort: Pulmonary effort is normal.   Musculoskeletal:      Right foot: Deformity (Tailor's bunion right.  Hammertoes bilateral) and prominent metatarsal heads present.      Left foot: Deformity and prominent metatarsal heads present.      Comments: Limited mobility   Feet:      Right foot:      Protective Sensation: 8 sites tested.  5 sites sensed.      Skin integrity: Callus (Painful callus with IPK K sub 5th met head right foot, no evidence of foreign body) present.      Toenail Condition: Right toenails are abnormally thick. Fungal disease present.     Left foot:      Protective Sensation: 8 sites tested.  5 sites sensed.      Skin integrity: No callus.      Toenail Condition: Left toenails are abnormally thick. Fungal disease present.  Skin:     Capillary Refill: Capillary refill takes 2 to 3 seconds.   Neurological:      General: No focal deficit present.      Mental Status: He is alert.      Comments: Diminished sensation distal digits bilateral, intact dorsal and plantar digits as well as rest of the foot   Psychiatric:         Behavior: Behavior normal.         Thought Content: Thought content normal.                               Contains abnormal data Hgb A1c w/ eAG Estimation           Component Ref Range & Units 1 mo ago  (12/5/23) 4 mo ago  (8/29/23) 7 mo ago  (5/23/23) 10 mo ago  (2/23/23) 1 yr ago  (11/16/22)   Hemoglobin A1C <5.7 % of total Hgb 7.3 High  7.5 High  7.7 High  8.6 High  7.9 High        EAG (MG/DL) mg/dL 163 169  200 180          Assessment:       1. Comprehensive diabetic foot examination, type 2 DM, encounter for    2. Type 2 diabetes mellitus with hyperglycemia, without long-term current use of insulin    3. Hammer toes of both feet    4. Tailor's bunion of right foot    5.  Right foot pain    6. Acquired keratosis of plantar aspect of foot    7. Onychomycosis of toenail            Plan:             Comprehensive diabetic pedal exam performed  Reviewed results monofilament testing with lack of sensation tips of the toes.  Advised patient and wife this puts him at risk for complications, important to check his feet each night and wear shoes indoors to protect his feet   Advised lack of sensation to feet is a sign of neuropathy.  PCP also has him diagnosed as a history of neuropathy and we discussed diabetic neuropathy at length  Reviewed conservative treatments which can be effective especially at night including soaking and topicals, most effective if done every single night  Reviewed A1c and benefit of controled glucose/diabetes regarding potential foot problems inckuding neuropathy.    Reviewed diabetic education  Advised callus/IPK under the 5th met head right foot has improved.  We reviewed pressure in this area in combination to prominent bone and foot type and structure.  Area was debrided, no skin break or discoloration.  Encouraged patient/wife to apply lotion to the area nightly when checking his feet  Reviewed care and maintenance of skin  Reviewed care and maintenance of fungal nails, nails debrided in thickness reduced and we reviewed topical treatments  Again reviewed foot type and structure and appropriate shoes indoors and out  Patient was in understanding and agreement with treatment plan.  I counseled the patient on their conditions, implications and medical management.  Instructed patient/family to contact the office with any changes, questions, concerns, worsening of symptoms.   Total face to face time 30 minutes, exam, assessment, treatment, discussion, additional time for review of chart prior to and following appointment and visit documentation, consultation and coordination of care.   Follow up as needed      This note was created using [employer] voice recognition  software that occasionally misinterpreted phrases or words.

## 2024-04-25 ENCOUNTER — OFFICE VISIT (OUTPATIENT)
Dept: PODIATRY | Facility: CLINIC | Age: 79
End: 2024-04-25
Payer: MEDICARE

## 2024-04-25 VITALS
DIASTOLIC BLOOD PRESSURE: 65 MMHG | BODY MASS INDEX: 26.1 KG/M2 | WEIGHT: 203.38 LBS | SYSTOLIC BLOOD PRESSURE: 139 MMHG | HEART RATE: 60 BPM | HEIGHT: 74 IN

## 2024-04-25 DIAGNOSIS — E11.65 TYPE 2 DIABETES MELLITUS WITH HYPERGLYCEMIA, WITHOUT LONG-TERM CURRENT USE OF INSULIN: ICD-10-CM

## 2024-04-25 DIAGNOSIS — B35.1 ONYCHOMYCOSIS OF TOENAIL: ICD-10-CM

## 2024-04-25 DIAGNOSIS — M20.42 HAMMER TOES OF BOTH FEET: ICD-10-CM

## 2024-04-25 DIAGNOSIS — M21.621 TAILOR'S BUNION OF RIGHT FOOT: Primary | ICD-10-CM

## 2024-04-25 DIAGNOSIS — M20.41 HAMMER TOES OF BOTH FEET: ICD-10-CM

## 2024-04-25 PROCEDURE — 99213 OFFICE O/P EST LOW 20 MIN: CPT | Mod: S$PBB,,, | Performed by: PODIATRIST

## 2024-04-25 PROCEDURE — 99214 OFFICE O/P EST MOD 30 MIN: CPT | Mod: PBBFAC | Performed by: PODIATRIST

## 2024-04-25 PROCEDURE — 99999 PR PBB SHADOW E&M-EST. PATIENT-LVL IV: CPT | Mod: PBBFAC,,, | Performed by: PODIATRIST

## 2024-04-25 RX ORDER — METOPROLOL SUCCINATE 25 MG/1
1 TABLET, EXTENDED RELEASE ORAL DAILY
COMMUNITY
Start: 2023-10-27

## 2024-04-25 RX ORDER — LISINOPRIL AND HYDROCHLOROTHIAZIDE 12.5; 2 MG/1; MG/1
1 TABLET ORAL DAILY
COMMUNITY
Start: 2023-10-27

## 2024-04-25 RX ORDER — CLOPIDOGREL BISULFATE 75 MG/1
1 TABLET ORAL DAILY
COMMUNITY
Start: 2024-01-15

## 2024-04-25 RX ORDER — GLIMEPIRIDE 4 MG/1
1 TABLET ORAL EVERY MORNING
COMMUNITY

## 2024-04-25 RX ORDER — NITROGLYCERIN 0.4 MG/1
0.4 TABLET SUBLINGUAL
COMMUNITY
Start: 2024-01-22

## 2024-04-25 RX ORDER — ATORVASTATIN CALCIUM 40 MG/1
1 TABLET, FILM COATED ORAL DAILY
COMMUNITY
Start: 2023-10-27

## 2024-04-25 RX ORDER — ASPIRIN 81 MG/1
1 TABLET ORAL DAILY
COMMUNITY

## 2024-04-25 RX ORDER — AMLODIPINE BESYLATE 5 MG/1
1 TABLET ORAL DAILY
COMMUNITY

## 2024-04-27 NOTE — PROGRESS NOTES
Subjective:       Patient ID: Michael Maldonado is a 78 y.o. male.    Chief Complaint: Follow-up and Diabetes Mellitus  Patient with type 2 diabetes with polyneuropathy presents with his wife for follow-up painful callus due to tailor's bunion sub 5th met head right foot.  Patient relates this area has done very well since last visit.  He has been wearing a good supportive shoe indoors, avoiding slippers and flat shoes which has helped.  Tries to stay active and do some walking daily.  Relates diabetes has been doing pretty well, just had blood work Tuesday this week but has not received the results of the most recent A1c.  Denies burning or tingling in feet      Past Medical History:   Diagnosis Date    Coronary atherosclerosis of unspecified type of vessel, native or graft     Diabetes mellitus, type 2     Gout, unspecified     Other and unspecified hyperlipidemia     Unspecified essential hypertension      Past Surgical History:   Procedure Laterality Date    CHOLECYSTECTOMY  11/20/2018        COLONOSCOPY  2010    CORONARY ANGIOPLASTY WITH STENT PLACEMENT  2016    CORONARY ARTERY BYPASS GRAFT  2000        heart stent N/A 01/11/2024    lap kaylah  10/2018    TONSILLECTOMY, ADENOIDECTOMY       No family history on file.  Social History     Socioeconomic History    Marital status:    Tobacco Use    Smoking status: Former    Smokeless tobacco: Former     Types: Chew   Substance and Sexual Activity    Alcohol use: Yes     Alcohol/week: 1.7 standard drinks of alcohol     Types: 2 Standard drinks or equivalent per week    Drug use: No       Current Outpatient Medications   Medication Sig Dispense Refill    amLODIPine (NORVASC) 5 MG tablet Take 1 tablet (5 mg total) by mouth once daily. 90 tablet 3    amLODIPine (NORVASC) 5 MG tablet Take 1 tablet by mouth once daily.      aspirin (ECOTRIN) 81 MG EC tablet Take 1 tablet by mouth once daily.      aspirin 81 MG Chew Take 81 mg by mouth once daily.       "atorvastatin (LIPITOR) 40 MG tablet TAKE 1 TABLET BY MOUTH EACH DAY "THANK YOU" 90 tablet 3    atorvastatin (LIPITOR) 40 MG tablet Take 1 tablet by mouth once daily.      blood sugar diagnostic (CONTOUR TEST STRIPS) Strp 1 strip by Misc.(Non-Drug; Combo Route) route 2 (two) times daily. 100 each 11    clopidogreL (PLAVIX) 75 mg tablet TAKE 1 TABLET BY MOUTH DAILY "THANK YOU" 90 tablet 3    clopidogreL (PLAVIX) 75 mg tablet Take 1 tablet by mouth once daily.      dapagliflozin propanediol (FARXIGA) 10 mg tablet Take 1 tablet (10 mg total) by mouth once daily. 90 tablet 3    fluticasone propionate (FLONASE) 50 mcg/actuation nasal spray 2 sprays (100 mcg total) by Each Nostril route once daily. 15.8 mL 0    glimepiride (AMARYL) 4 MG tablet Take 2 tablets (8 mg total) by mouth daily with breakfast. 180 tablet 1    glimepiride (AMARYL) 4 MG tablet Take 1 tablet by mouth every morning.      lisinopriL-hydrochlorothiazide (PRINZIDE,ZESTORETIC) 20-12.5 mg per tablet Take 1 tablet by mouth 2 (two) times daily. 180 tablet 3    lisinopriL-hydrochlorothiazide (PRINZIDE,ZESTORETIC) 20-12.5 mg per tablet Take 1 tablet by mouth once daily.      metoprolol succinate (TOPROL-XL) 25 MG 24 hr tablet TAKE 1 TABLET BY MOUTH EACH MORNING AND TAKE 1/2 TABLET BY MOUTH EACH EVENING "THANK YOU" 135 tablet 3    metoprolol succinate (TOPROL-XL) 25 MG 24 hr tablet Take 1 tablet by mouth once daily.      nitroGLYCERIN (NITROSTAT) 0.4 MG SL tablet Place 0.4 mg under the tongue.      SITagliptan-metformin (JANUMET) 50-1,000 mg per tablet Take 1 tablet by mouth 2 (two) times daily with meals. 180 tablet 3    UNIFINE PENTIPS PLUS 32 gauge x 5/32" Ndle USE 3 TIMES A DAY WITH MEALS "THANK YOU" 200 each 11    azelastine (ASTELIN) 137 mcg (0.1 %) nasal spray 1 spray (137 mcg total) by Nasal route 2 (two) times daily. (Patient not taking: Reported on 1/12/2024) 30 mL 0     No current facility-administered medications for this visit.     Review of " "patient's allergies indicates:   Allergen Reactions    Cefuroxime axetil Nausea And Vomiting     vomiting       Review of Systems   Musculoskeletal:  Positive for gait problem.   All other systems reviewed and are negative.      Objective:      Vitals:    04/25/24 1052   BP: 139/65   Pulse: 60   Weight: 92.3 kg (203 lb 6.4 oz)   Height: 6' 2" (1.88 m)     Physical Exam  Vitals and nursing note reviewed. Exam conducted with a chaperone present.   Constitutional:       General: He is not in acute distress.  Cardiovascular:      Pulses:           Dorsalis pedis pulses are 1+ on the right side and 1+ on the left side.        Posterior tibial pulses are 1+ on the right side and 1+ on the left side.   Pulmonary:      Effort: Pulmonary effort is normal.   Musculoskeletal:      Right foot: Deformity (Tailor's bunion right>left.  Hammertoes bilateral.  Arthritic and degenerative changes bilateral feet) and prominent metatarsal heads present.      Left foot: Deformity and prominent metatarsal heads present.      Comments: Limited mobility   Feet:      Right foot:      Skin integrity: Callus (stable callus with IPK  sub 5th met head right foot, no evidence of foreign body) present.      Toenail Condition: Right toenails are abnormally thick. Fungal disease present.     Left foot:      Skin integrity: No callus.      Toenail Condition: Left toenails are abnormally thick. Fungal disease present.  Skin:     Capillary Refill: Capillary refill takes 2 to 3 seconds.   Neurological:      Comments: Diminished sensation distal digits bilateral, paresthesias feet   Psychiatric:         Thought Content: Thought content normal.         Contains abnormal data Hgb A1c w/ eAG Estimation       Component Ref Range & Units 4 mo ago  (12/5/23)   Hemoglobin A1C <5.7 % of total Hgb 7.3 High        EAG (MG/DL) mg/dL 163                    Assessment:       1. Tailor's bunion of right foot    2. Hammer toes of both feet    3. Type 2 diabetes " mellitus with hyperglycemia, without long-term current use of insulin    4. Onychomycosis of toenail              Plan:           Reviewed arthritis especially in the front of the feet/toes   Discussed tailor's bunion making the 5th metatarsal joint more prominent, this is the area he had developed a painful callus on the right foot last visit  Reviewed neuropathy and some diminished sensation in his feet especially toes  Reviewed diabetic education and potential complications  Reviewed appropriate shoes indoors and out and hydration of skin to prevent recurrence  Reviewed over-the-counter Voltaren gel for any joint pain in feet apply daily as directed  Reviewed care and maintenance of fungal nails, nails debrided in thickness reduced and we reviewed topical treatments  With diabetes, lack of sensation to toes, arthritis explained it is important he check his feet as well as he can every day and have wife inspect thoroughly once a week  Patient was in understanding and agreement with treatment plan.  I counseled the patient on their conditions, implications and medical management.  Instructed patient/family to contact the office with any changes, questions, concerns, worsening of symptoms.   Total face to face time 20 minutes, exam, assessment, treatment, discussion, additional time for review of chart prior to and following appointment and visit documentation, consultation and coordination of care.   Follow up prn 3 months      This note was created using [employer] voice recognition software that occasionally misinterpreted phrases or words.

## 2024-08-02 ENCOUNTER — OFFICE VISIT (OUTPATIENT)
Dept: PODIATRY | Facility: CLINIC | Age: 79
End: 2024-08-02
Payer: MEDICARE

## 2024-08-02 VITALS
WEIGHT: 198.19 LBS | SYSTOLIC BLOOD PRESSURE: 108 MMHG | BODY MASS INDEX: 25.44 KG/M2 | HEART RATE: 61 BPM | HEIGHT: 74 IN | DIASTOLIC BLOOD PRESSURE: 67 MMHG

## 2024-08-02 DIAGNOSIS — E11.65 TYPE 2 DIABETES MELLITUS WITH HYPERGLYCEMIA, WITHOUT LONG-TERM CURRENT USE OF INSULIN: ICD-10-CM

## 2024-08-02 DIAGNOSIS — M20.41 HAMMER TOES OF BOTH FEET: Primary | ICD-10-CM

## 2024-08-02 DIAGNOSIS — L85.1 ACQUIRED KERATOSIS OF PLANTAR ASPECT OF FOOT: ICD-10-CM

## 2024-08-02 DIAGNOSIS — M20.42 HAMMER TOES OF BOTH FEET: Primary | ICD-10-CM

## 2024-08-02 DIAGNOSIS — B35.1 ONYCHOMYCOSIS OF TOENAIL: ICD-10-CM

## 2024-08-02 PROCEDURE — 99999 PR PBB SHADOW E&M-EST. PATIENT-LVL V: CPT | Mod: PBBFAC,,, | Performed by: PODIATRIST

## 2024-08-02 PROCEDURE — 99215 OFFICE O/P EST HI 40 MIN: CPT | Mod: PBBFAC | Performed by: PODIATRIST

## 2024-08-02 PROCEDURE — 99213 OFFICE O/P EST LOW 20 MIN: CPT | Mod: S$PBB,,, | Performed by: PODIATRIST

## 2024-11-08 ENCOUNTER — OFFICE VISIT (OUTPATIENT)
Dept: PODIATRY | Facility: CLINIC | Age: 79
End: 2024-11-08
Payer: MEDICARE

## 2024-11-08 VITALS
SYSTOLIC BLOOD PRESSURE: 121 MMHG | BODY MASS INDEX: 25.19 KG/M2 | HEIGHT: 74 IN | HEART RATE: 62 BPM | DIASTOLIC BLOOD PRESSURE: 64 MMHG | WEIGHT: 196.31 LBS | RESPIRATION RATE: 18 BRPM

## 2024-11-08 DIAGNOSIS — M20.42 HAMMER TOES OF BOTH FEET: ICD-10-CM

## 2024-11-08 DIAGNOSIS — E11.65 TYPE 2 DIABETES MELLITUS WITH HYPERGLYCEMIA, WITHOUT LONG-TERM CURRENT USE OF INSULIN: ICD-10-CM

## 2024-11-08 DIAGNOSIS — M20.41 HAMMER TOES OF BOTH FEET: ICD-10-CM

## 2024-11-08 DIAGNOSIS — M21.621 TAILOR'S BUNION OF RIGHT FOOT: ICD-10-CM

## 2024-11-08 DIAGNOSIS — M20.12 HALLUX ABDUCTO VALGUS, BILATERAL: Primary | ICD-10-CM

## 2024-11-08 DIAGNOSIS — M20.11 HALLUX ABDUCTO VALGUS, BILATERAL: Primary | ICD-10-CM

## 2024-11-08 DIAGNOSIS — B35.1 ONYCHOMYCOSIS OF TOENAIL: ICD-10-CM

## 2024-11-08 PROCEDURE — 99213 OFFICE O/P EST LOW 20 MIN: CPT | Mod: S$PBB,,, | Performed by: PODIATRIST

## 2024-11-08 PROCEDURE — 99999 PR PBB SHADOW E&M-EST. PATIENT-LVL IV: CPT | Mod: PBBFAC,,, | Performed by: PODIATRIST

## 2024-11-08 PROCEDURE — 99214 OFFICE O/P EST MOD 30 MIN: CPT | Mod: PBBFAC | Performed by: PODIATRIST

## 2024-11-10 NOTE — PROGRESS NOTES
Subjective:       Patient ID: Michael Maldonado is a 79 y.o. male.    Chief Complaint: Follow-up and Diabetes Mellitus  Patient with type 2 diabetes with neuropathy presents with his wife for follow-up.    Relates callus sub 5th met head right foot remains much improved, wears tennis shoes all the time, no pain  Relates diabetes has been doing very well with A1c 7.0 range  Denies burning or tingling in feet      Past Medical History:   Diagnosis Date    Coronary atherosclerosis of unspecified type of vessel, native or graft     Diabetes mellitus, type 2     Gout, unspecified     Other and unspecified hyperlipidemia     Unspecified essential hypertension      Past Surgical History:   Procedure Laterality Date    CHOLECYSTECTOMY  11/20/2018        COLONOSCOPY  2010    CORONARY ANGIOPLASTY WITH STENT PLACEMENT  2016    CORONARY ARTERY BYPASS GRAFT  2000        heart stent N/A 01/11/2024    lap kaylah  10/2018    TONSILLECTOMY, ADENOIDECTOMY       No family history on file.  Social History     Socioeconomic History    Marital status:    Tobacco Use    Smoking status: Former    Smokeless tobacco: Former     Types: Chew   Substance and Sexual Activity    Alcohol use: Yes     Alcohol/week: 1.7 standard drinks of alcohol     Types: 2 Standard drinks or equivalent per week    Drug use: No       Current Outpatient Medications   Medication Sig Dispense Refill    amLODIPine (NORVASC) 5 MG tablet Take 1 tablet by mouth once daily.      aspirin 81 MG Chew Take 81 mg by mouth once daily.      atorvastatin (LIPITOR) 40 MG tablet Take 1 tablet by mouth once daily.      blood sugar diagnostic (CONTOUR TEST STRIPS) Strp 1 strip by Misc.(Non-Drug; Combo Route) route 2 (two) times daily. 100 each 11    clopidogreL (PLAVIX) 75 mg tablet Take 1 tablet by mouth once daily.      dapagliflozin propanediol (FARXIGA) 10 mg tablet Take 1 tablet (10 mg total) by mouth once daily. 90 tablet 3    fluticasone propionate  "(FLONASE) 50 mcg/actuation nasal spray 2 sprays (100 mcg total) by Each Nostril route once daily. 15.8 mL 0    glimepiride (AMARYL) 4 MG tablet Take 1 tablet by mouth every morning.      lisinopriL-hydrochlorothiazide (PRINZIDE,ZESTORETIC) 20-12.5 mg per tablet Take 1 tablet by mouth 2 (two) times daily. 180 tablet 3    metoprolol succinate (TOPROL-XL) 25 MG 24 hr tablet TAKE 1 TABLET BY MOUTH EACH MORNING AND TAKE 1/2 TABLET BY MOUTH EACH EVENING "THANK YOU" 135 tablet 3    SITagliptan-metformin (JANUMET) 50-1,000 mg per tablet Take 1 tablet by mouth 2 (two) times daily with meals. 180 tablet 3    UNIFINE PENTIPS PLUS 32 gauge x 5/32" Ndle USE 3 TIMES A DAY WITH MEALS "THANK YOU" 200 each 11    nitroGLYCERIN (NITROSTAT) 0.4 MG SL tablet Place 0.4 mg under the tongue. (Patient not taking: Reported on 11/8/2024)       No current facility-administered medications for this visit.     Review of patient's allergies indicates:   Allergen Reactions    Cefuroxime axetil Nausea And Vomiting     vomiting       Review of Systems   Musculoskeletal:  Positive for gait problem.   All other systems reviewed and are negative.      Objective:      Vitals:    11/08/24 1029   BP: 121/64   Pulse: 62   Resp: 18   Weight: 89 kg (196 lb 4.8 oz)   Height: 6' 2" (1.88 m)     Physical Exam  Vitals and nursing note reviewed. Exam conducted with a chaperone present.   Constitutional:       General: He is not in acute distress.  Cardiovascular:      Pulses:           Dorsalis pedis pulses are 1+ on the right side and 1+ on the left side.        Posterior tibial pulses are 1+ on the right side and 1+ on the left side.   Musculoskeletal:      Right foot: Decreased range of motion. Deformity (Tailor's bunion right>left.  Hammertoes bilateral.  Arthritic and degenerative changes bilateral feet), bunion and prominent metatarsal heads present.      Left foot: Decreased range of motion. Deformity, bunion and prominent metatarsal heads present.      " Comments: Limited mobility   Feet:      Right foot:      Skin integrity: Callus (decreased callus sub 5th met head right foot) present.      Toenail Condition: Right toenails are abnormally thick. Fungal disease present.     Left foot:      Toenail Condition: Left toenails are abnormally thick. Fungal disease present.  Skin:     Capillary Refill: Capillary refill takes 2 to 3 seconds.   Psychiatric:         Thought Content: Thought content normal.                     Assessment:       1. Hallux abducto valgus, bilateral    2. Tailor's bunion of right foot    3. Hammer toes of both feet    4. Type 2 diabetes mellitus with hyperglycemia, without long-term current use of insulin    5. Onychomycosis of toenail              Plan:         Reviewed bunions great toes, hammertoes  Reviewed tailor's bunion prominent plantar bone especially on the right foot causing callus which has improved significantly  Callus debrided sub 5th met head right foot  Light weight tennis shoe should be worn indoors as well to protect feet  Reviewed diabetic education and potential complications  Reviewed care and maintenance of skin and fungal nails, nails debrided, thickness reduced   Reviewed importance of regular foot checks  Patient was in understanding and agreement with treatment plan.  I counseled the patient on their conditions, implications and medical management.  Instructed patient/family to contact the office with any changes, questions, concerns, worsening of symptoms.   Total face to face time 20 minutes, exam, assessment, treatment, discussion, additional time for review of chart prior to and following appointment and visit documentation, consultation and coordination of care.   Follow up prn 3 months      This note was created using [employer] voice recognition software that occasionally misinterpreted phrases or words.

## 2025-02-14 ENCOUNTER — OFFICE VISIT (OUTPATIENT)
Dept: PODIATRY | Facility: CLINIC | Age: 80
End: 2025-02-14
Payer: MEDICARE

## 2025-02-14 VITALS
SYSTOLIC BLOOD PRESSURE: 150 MMHG | DIASTOLIC BLOOD PRESSURE: 67 MMHG | BODY MASS INDEX: 26.24 KG/M2 | WEIGHT: 204.5 LBS | HEIGHT: 74 IN | HEART RATE: 70 BPM

## 2025-02-14 DIAGNOSIS — M20.11 HALLUX ABDUCTO VALGUS, BILATERAL: ICD-10-CM

## 2025-02-14 DIAGNOSIS — M21.621 TAILOR'S BUNION OF RIGHT FOOT: ICD-10-CM

## 2025-02-14 DIAGNOSIS — M20.12 HALLUX ABDUCTO VALGUS, BILATERAL: ICD-10-CM

## 2025-02-14 DIAGNOSIS — B35.1 ONYCHOMYCOSIS OF TOENAIL: ICD-10-CM

## 2025-02-14 DIAGNOSIS — E11.9 COMPREHENSIVE DIABETIC FOOT EXAMINATION, TYPE 2 DM, ENCOUNTER FOR: Primary | ICD-10-CM

## 2025-02-14 DIAGNOSIS — E11.65 TYPE 2 DIABETES MELLITUS WITH HYPERGLYCEMIA, WITHOUT LONG-TERM CURRENT USE OF INSULIN: ICD-10-CM

## 2025-02-14 DIAGNOSIS — M20.41 HAMMER TOES OF BOTH FEET: ICD-10-CM

## 2025-02-14 DIAGNOSIS — M20.42 HAMMER TOES OF BOTH FEET: ICD-10-CM

## 2025-02-14 DIAGNOSIS — L84 FOOT CALLUS: ICD-10-CM

## 2025-02-14 PROCEDURE — 99214 OFFICE O/P EST MOD 30 MIN: CPT | Mod: PBBFAC | Performed by: PODIATRIST

## 2025-02-16 NOTE — PROGRESS NOTES
Subjective:       Patient ID: Michael Maldonado is a 79 y.o. male.    Chief Complaint: Diabetic Foot Exam  Patient presents with his wife today for annual diabetic foot exam, follow-up callus sub 5th met head right foot  Patient relates this area has remained improved since our 1st initial few visits  Confirms wearing tennis shoes all the time and most of the time indoors, has helped with his foot pain  Relates he has been diabetic for a long time, does monitor his diet and relates A1c is usually in the 7.0 range, he is due for blood work  Reports glucose is checked at home regularly unusually around 160   Denies burning or tingling in feet  No history of foot sores or infections    Past Medical History:   Diagnosis Date    Coronary atherosclerosis of unspecified type of vessel, native or graft     Diabetes mellitus, type 2     Gout, unspecified     Other and unspecified hyperlipidemia     Unspecified essential hypertension      Past Surgical History:   Procedure Laterality Date    CHOLECYSTECTOMY  11/20/2018        COLONOSCOPY  2010    CORONARY ANGIOPLASTY WITH STENT PLACEMENT  2016    CORONARY ARTERY BYPASS GRAFT  2000        heart stent N/A 01/11/2024    lap kaylah  10/2018    TONSILLECTOMY, ADENOIDECTOMY       No family history on file.  Social History     Socioeconomic History    Marital status:    Tobacco Use    Smoking status: Former    Smokeless tobacco: Former     Types: Chew   Substance and Sexual Activity    Alcohol use: Yes     Alcohol/week: 1.7 standard drinks of alcohol     Types: 2 Standard drinks or equivalent per week    Drug use: No       Current Outpatient Medications   Medication Sig Dispense Refill    amLODIPine (NORVASC) 5 MG tablet Take 1 tablet by mouth once daily.      aspirin 81 MG Chew Take 81 mg by mouth once daily.      atorvastatin (LIPITOR) 40 MG tablet Take 1 tablet by mouth once daily.      blood sugar diagnostic (CONTOUR TEST STRIPS) Strp 1 strip by  "Misc.(Non-Drug; Combo Route) route 2 (two) times daily. 100 each 11    clopidogreL (PLAVIX) 75 mg tablet Take 1 tablet by mouth once daily.      dapagliflozin propanediol (FARXIGA) 10 mg tablet Take 1 tablet (10 mg total) by mouth once daily. 90 tablet 3    glimepiride (AMARYL) 4 MG tablet Take 1 tablet by mouth every morning.      lisinopriL-hydrochlorothiazide (PRINZIDE,ZESTORETIC) 20-12.5 mg per tablet Take 1 tablet by mouth 2 (two) times daily. 180 tablet 3    metoprolol succinate (TOPROL-XL) 25 MG 24 hr tablet TAKE 1 TABLET BY MOUTH EACH MORNING AND TAKE 1/2 TABLET BY MOUTH EACH EVENING "THANK YOU" 135 tablet 3    SITagliptan-metformin (JANUMET) 50-1,000 mg per tablet Take 1 tablet by mouth 2 (two) times daily with meals. 180 tablet 3    UNIFINE PENTIPS PLUS 32 gauge x 5/32" Ndle USE 3 TIMES A DAY WITH MEALS "THANK YOU" 200 each 11    fluticasone propionate (FLONASE) 50 mcg/actuation nasal spray 2 sprays (100 mcg total) by Each Nostril route once daily. (Patient not taking: Reported on 2/14/2025) 15.8 mL 0    nitroGLYCERIN (NITROSTAT) 0.4 MG SL tablet Place 0.4 mg under the tongue. (Patient not taking: Reported on 11/8/2024)       No current facility-administered medications for this visit.     Review of patient's allergies indicates:   Allergen Reactions    Cefuroxime axetil Nausea And Vomiting     vomiting       Review of Systems   Cardiovascular:  Negative for leg swelling.   Musculoskeletal:  Positive for gait problem.   All other systems reviewed and are negative.      Objective:      Vitals:    02/14/25 1035   BP: (!) 150/67   BP Location: Right arm   Patient Position: Sitting   Pulse: 70   Weight: 92.8 kg (204 lb 8 oz)   Height: 6' 2" (1.88 m)     Physical Exam  Vitals and nursing note reviewed. Exam conducted with a chaperone present.   Constitutional:       General: He is not in acute distress.     Appearance: Normal appearance.   Cardiovascular:      Pulses:           Dorsalis pedis pulses are 1+ on " the right side and 1+ on the left side.        Posterior tibial pulses are 1+ on the right side and 1+ on the left side.   Musculoskeletal:         General: No tenderness.      Right foot: Decreased range of motion. Deformity (Tailor's bunion right>left.  Hammertoes bilateral.  Arthritic and degenerative changes bilateral feet), bunion and prominent metatarsal heads present.      Left foot: Decreased range of motion. Deformity, bunion and prominent metatarsal heads present.      Comments: Limited mobility   Feet:      Right foot:      Protective Sensation: 7 sites tested.  5 sites sensed.      Skin integrity: Callus (decreased callus sub 5th met head right foot) present.      Toenail Condition: Right toenails are abnormally thick. Fungal disease present.     Left foot:      Protective Sensation: 7 sites tested.  5 sites sensed.      Toenail Condition: Left toenails are abnormally thick. Fungal disease present.  Skin:     Capillary Refill: Capillary refill takes 2 to 3 seconds.   Neurological:      Mental Status: He is alert.      Comments: Sensation conflicting distal digits, few were undetected and tested several times dorsal digits intact plantar and dorsal mid and rear foot intact bilateral, no pain/paresthesias feet   Psychiatric:         Thought Content: Thought content normal.                       Contains abnormal data Hgb A1c w/ eAG Estimation            Component  Ref Range & Units (hover) 9 mo ago  (4/23/24) 1 yr ago  (12/5/23) 1 yr ago  (8/29/23) 1 yr ago  (5/23/23) 1 yr ago  (2/23/23) 2 yr ago  (11/16/22) 2 yr ago  (7/14/22)   Hemoglobin A1C 7.8 High  7.3 High   7.5 High   7.7 High  8.6 High  7.9 High   7.9 High        EAG (MG/DL) 177 163 169                Assessment:       1. Comprehensive diabetic foot examination, type 2 DM, encounter for    2. Type 2 diabetes mellitus with hyperglycemia, without long-term current use of insulin    3. Hallux abducto valgus, bilateral    4. Tailor's bunion of right  foot    5. Hammer toes of both feet    6. Foot callus - Right Foot    7. Onychomycosis of toenail                Plan:           Comprehensive diabetic pedal exam performed  Reviewed conflicting sensation distal digits, this unfortunately is consistent with the length of time he has been diabetic, he detected monofilament in the all other areas, and really is not at risk for any complications but should be aware of changes in sensation    Reviewed neuropathy  Reviewed diabetic education  Reviewed A1c   Reviewed benefit of controled glucose/diabetes regarding potential foot problems  Reviewed bunions of the 1st MPJ and 5th MPJ and hammertoes, appropriate shoes to accommodate, avoid friction over these areas and we reviewed potential complications  Mild callus debrided sub 5th met head right foot  Light weight tennis shoe should be worn indoors as well to protect feet  Reviewed diabetic education pertaining to potential foot complications  Patient is at low risk  Reviewed care and maintenance of skin and fungal nails, nails debrided, thickness reduced   Reviewed importance of regular foot checks  Patient was in understanding and agreement with treatment plan.  I counseled the patient on their conditions, implications and medical management.  Instructed patient/family to contact the office with any changes, questions, concerns, worsening of symptoms.   Total face to face time 30 minutes, exam, assessment, treatment, discussion, additional time for review of chart prior to and following appointment and visit documentation, consultation and coordination of care.   Follow up prn 3 months      This note was created using [employer] voice recognition software that occasionally misinterpreted phrases or words.             No

## 2025-05-16 ENCOUNTER — OFFICE VISIT (OUTPATIENT)
Dept: PODIATRY | Facility: CLINIC | Age: 80
End: 2025-05-16
Payer: MEDICARE

## 2025-05-16 VITALS
SYSTOLIC BLOOD PRESSURE: 120 MMHG | HEIGHT: 74 IN | RESPIRATION RATE: 18 BRPM | BODY MASS INDEX: 25 KG/M2 | DIASTOLIC BLOOD PRESSURE: 63 MMHG | WEIGHT: 194.81 LBS | HEART RATE: 62 BPM

## 2025-05-16 DIAGNOSIS — M20.12 HALLUX ABDUCTO VALGUS, BILATERAL: ICD-10-CM

## 2025-05-16 DIAGNOSIS — B35.1 ONYCHOMYCOSIS OF TOENAIL: ICD-10-CM

## 2025-05-16 DIAGNOSIS — L85.1 ACQUIRED KERATOSIS OF PLANTAR ASPECT OF FOOT: ICD-10-CM

## 2025-05-16 DIAGNOSIS — M20.11 HALLUX ABDUCTO VALGUS, BILATERAL: ICD-10-CM

## 2025-05-16 DIAGNOSIS — E11.65 TYPE 2 DIABETES MELLITUS WITH HYPERGLYCEMIA, WITHOUT LONG-TERM CURRENT USE OF INSULIN: ICD-10-CM

## 2025-05-16 DIAGNOSIS — M21.621 TAILOR'S BUNION OF RIGHT FOOT: Primary | ICD-10-CM

## 2025-05-16 PROCEDURE — 99999 PR PBB SHADOW E&M-EST. PATIENT-LVL IV: CPT | Mod: PBBFAC,,, | Performed by: PODIATRIST

## 2025-05-16 PROCEDURE — 99213 OFFICE O/P EST LOW 20 MIN: CPT | Mod: S$PBB,,, | Performed by: PODIATRIST

## 2025-05-16 PROCEDURE — 99214 OFFICE O/P EST MOD 30 MIN: CPT | Mod: PBBFAC | Performed by: PODIATRIST

## 2025-05-16 RX ORDER — POLYMYXIN B SULFATE AND TRIMETHOPRIM 1; 10000 MG/ML; [USP'U]/ML
1 SOLUTION OPHTHALMIC 4 TIMES DAILY
COMMUNITY
Start: 2025-04-18

## 2025-05-16 RX ORDER — PREDNISOLONE ACETATE 10 MG/ML
1 SUSPENSION/ DROPS OPHTHALMIC 4 TIMES DAILY
COMMUNITY
Start: 2025-04-18

## 2025-05-18 NOTE — PROGRESS NOTES
Subjective:       Patient ID: Michael Maldonado is a 79 y.o. male.    Chief Complaint: Diabetes Mellitus and Foot Pain  Patient presents with complaint very painful area under the ball of the right foot.  Patient relates he has not had a problem with their area and a while and it has been much more painful than it was previously.  Denies any change in shoes or activity.  Relates he is not barefoot at home.  Pain level 5/10  Patient has a long history well-controlled diabetes with A1c low 7.0 range    Past Medical History:   Diagnosis Date    Coronary atherosclerosis of unspecified type of vessel, native or graft     Diabetes mellitus, type 2     Gout, unspecified     Other and unspecified hyperlipidemia     Unspecified essential hypertension      Past Surgical History:   Procedure Laterality Date    CHOLECYSTECTOMY  11/20/2018        COLONOSCOPY  2010    CORONARY ANGIOPLASTY WITH STENT PLACEMENT  2016    CORONARY ARTERY BYPASS GRAFT  2000        heart stent N/A 01/11/2024    lap kaylah  10/2018    TONSILLECTOMY, ADENOIDECTOMY       No family history on file.  Social History     Socioeconomic History    Marital status:    Tobacco Use    Smoking status: Former    Smokeless tobacco: Former     Types: Chew   Substance and Sexual Activity    Alcohol use: Yes     Alcohol/week: 1.7 standard drinks of alcohol     Types: 2 Standard drinks or equivalent per week    Drug use: No       Current Outpatient Medications   Medication Sig Dispense Refill    amLODIPine (NORVASC) 5 MG tablet Take 1 tablet by mouth once daily.      aspirin 81 MG Chew Take 81 mg by mouth once daily.      atorvastatin (LIPITOR) 40 MG tablet Take 1 tablet by mouth once daily.      blood sugar diagnostic (CONTOUR TEST STRIPS) Strp 1 strip by Misc.(Non-Drug; Combo Route) route 2 (two) times daily. 100 each 11    clopidogreL (PLAVIX) 75 mg tablet Take 1 tablet by mouth once daily.      dapagliflozin propanediol (FARXIGA) 10 mg tablet Take  "1 tablet (10 mg total) by mouth once daily. 90 tablet 3    glimepiride (AMARYL) 4 MG tablet Take 1 tablet by mouth every morning.      lisinopriL-hydrochlorothiazide (PRINZIDE,ZESTORETIC) 20-12.5 mg per tablet Take 1 tablet by mouth 2 (two) times daily. 180 tablet 3    metoprolol succinate (TOPROL-XL) 25 MG 24 hr tablet TAKE 1 TABLET BY MOUTH EACH MORNING AND TAKE 1/2 TABLET BY MOUTH EACH EVENING "THANK YOU" 135 tablet 3    polymyxin B sulf-trimethoprim (POLYTRIM) 10,000 unit- 1 mg/mL Drop Place 1 drop into the right eye 4 (four) times daily.      prednisoLONE acetate (PRED FORTE) 1 % DrpS Place 1 drop into the right eye 4 (four) times daily.      SITagliptan-metformin (JANUMET) 50-1,000 mg per tablet Take 1 tablet by mouth 2 (two) times daily with meals. 180 tablet 3    UNIFINE PENTIPS PLUS 32 gauge x 5/32" Ndle USE 3 TIMES A DAY WITH MEALS "THANK YOU" 200 each 11    nitroGLYCERIN (NITROSTAT) 0.4 MG SL tablet Place 0.4 mg under the tongue. (Patient not taking: Reported on 5/16/2025)       No current facility-administered medications for this visit.     Review of patient's allergies indicates:   Allergen Reactions    Cefuroxime axetil Nausea And Vomiting     vomiting       Review of Systems   Cardiovascular:  Negative for leg swelling.   Musculoskeletal:  Positive for gait problem.   All other systems reviewed and are negative.      Objective:      Vitals:    05/16/25 1011   BP: 120/63   Pulse: 62   Resp: 18   Weight: 88.4 kg (194 lb 12.8 oz)   Height: 6' 2" (1.88 m)     Physical Exam  Vitals and nursing note reviewed.   Constitutional:       General: He is not in acute distress.     Appearance: Normal appearance.   Cardiovascular:      Pulses:           Dorsalis pedis pulses are 1+ on the right side and 1+ on the left side.        Posterior tibial pulses are 1+ on the right side and 1+ on the left side.   Musculoskeletal:         General: Tenderness present.      Right foot: Decreased range of motion. Deformity " (Tailor's bunion right>left.  Hammertoes bilateral.  Arthritic and degenerative changes bilateral feet), bunion and prominent metatarsal heads present.      Left foot: Decreased range of motion. Deformity, bunion and prominent metatarsal heads present.      Comments: Very tender prominent 5th met head/tailor's bunion with callus lesion right foot   Feet:      Right foot:      Skin integrity: Callus (Very tender increased callus/IPK sub 5th met head right foot) present.      Toenail Condition: Right toenails are abnormally thick. Fungal disease present.     Left foot:      Toenail Condition: Left toenails are abnormally thick. Fungal disease present.  Skin:     Capillary Refill: Capillary refill takes 2 to 3 seconds.   Neurological:      Mental Status: He is alert.   Psychiatric:         Thought Content: Thought content normal.                       Assessment:       1. Tailor's bunion of right foot    2. Acquired keratosis of plantar aspect of foot    3. Hallux abducto valgus, bilateral    4. Type 2 diabetes mellitus with hyperglycemia, without long-term current use of insulin    5. Onychomycosis of toenail                Plan:         Reviewed bunions of the 1st MPJ and 5th MPJ and hammertoes  Reviewed tailor's bunion and prominent 5th metatarsal head, this is the area contributing to the chronic on and off callus under the 5th met head of the right foot  Advised patient this area has progressed since last visit  Reviewed at length the need to treat topically with lotion, moisturizer, essential oil, discussed multiple options to apply every night to help prevent recurrence  Instructed patient on use of over-the-counter Voltaren gel applied daily as directed for pain  Callus debrided, IPK removed sub 5th met head right foot.  Spot of blood present, brought to patient's attention, cleansed with alcohol, antibiotic ointment gauze and fabric bandage applied.  Instructed patient to leave intact until the morning, have  his wife remove and check.  If it is sensitive tomorrow apply antibiotic ointment and a bandage.  Always remove this before bed to dry out and if it is not pain-free in 2 days contact office.  Contact office with any changes or concerns.  Patient understands precautions needed due to his diabetes  Reviewed appropriate tennis shoes with thick sole for shock absorption should be worn indoors as well to protect feet  Reviewed diabetic education pertaining to potential foot complications  Reviewed care and maintenance of skin and fungal nails.  Nails debrided, thickness reduced  Reviewed diabetic education pertaining to feet   Reviewed importance of regular foot checks  Patient was in understanding and agreement with treatment plan.  I counseled the patient on their conditions, implications and medical management.  Instructed patient to contact the office with any changes, questions, concerns, worsening of symptoms.   Total face to face time 20 minutes, exam, assessment, treatment, discussion, additional time for review of chart prior to and following appointment and visit documentation, consultation and coordination of care.   Follow up prn 3 months      This note was created using [employer] voice recognition software that occasionally misinterpreted phrases or words.

## 2025-08-22 ENCOUNTER — OFFICE VISIT (OUTPATIENT)
Dept: PODIATRY | Facility: CLINIC | Age: 80
End: 2025-08-22
Payer: MEDICARE

## 2025-08-22 VITALS
DIASTOLIC BLOOD PRESSURE: 67 MMHG | BODY MASS INDEX: 25.17 KG/M2 | HEIGHT: 74 IN | SYSTOLIC BLOOD PRESSURE: 120 MMHG | HEART RATE: 72 BPM | WEIGHT: 196.13 LBS

## 2025-08-22 DIAGNOSIS — M20.12 HALLUX ABDUCTO VALGUS, BILATERAL: Primary | ICD-10-CM

## 2025-08-22 DIAGNOSIS — E11.65 TYPE 2 DIABETES MELLITUS WITH HYPERGLYCEMIA, WITHOUT LONG-TERM CURRENT USE OF INSULIN: ICD-10-CM

## 2025-08-22 DIAGNOSIS — M20.11 HALLUX ABDUCTO VALGUS, BILATERAL: Primary | ICD-10-CM

## 2025-08-22 DIAGNOSIS — M21.621 TAILOR'S BUNION OF RIGHT FOOT: ICD-10-CM

## 2025-08-22 DIAGNOSIS — B35.1 ONYCHOMYCOSIS OF TOENAIL: ICD-10-CM

## 2025-08-22 PROCEDURE — 99213 OFFICE O/P EST LOW 20 MIN: CPT | Mod: PBBFAC | Performed by: PODIATRIST

## 2025-08-22 PROCEDURE — 99213 OFFICE O/P EST LOW 20 MIN: CPT | Mod: S$PBB,,, | Performed by: PODIATRIST

## 2025-08-22 PROCEDURE — 99999 PR PBB SHADOW E&M-EST. PATIENT-LVL III: CPT | Mod: PBBFAC,,, | Performed by: PODIATRIST

## 2025-08-22 RX ORDER — DOXYCYCLINE 100 MG/1
100 CAPSULE ORAL 2 TIMES DAILY
COMMUNITY
Start: 2025-08-20